# Patient Record
Sex: MALE | Race: WHITE | NOT HISPANIC OR LATINO | URBAN - METROPOLITAN AREA
[De-identification: names, ages, dates, MRNs, and addresses within clinical notes are randomized per-mention and may not be internally consistent; named-entity substitution may affect disease eponyms.]

---

## 2021-02-15 ENCOUNTER — OFFICE (AMBULATORY)
Dept: URBAN - METROPOLITAN AREA PATHOLOGY 4 | Facility: PATHOLOGY | Age: 51
End: 2021-02-15
Payer: COMMERCIAL

## 2021-02-15 ENCOUNTER — ON CAMPUS - OUTPATIENT (AMBULATORY)
Dept: URBAN - METROPOLITAN AREA HOSPITAL 2 | Facility: HOSPITAL | Age: 51
End: 2021-02-15
Payer: COMMERCIAL

## 2021-02-15 VITALS
RESPIRATION RATE: 18 BRPM | HEART RATE: 85 BPM | HEART RATE: 110 BPM | HEART RATE: 90 BPM | SYSTOLIC BLOOD PRESSURE: 100 MMHG | SYSTOLIC BLOOD PRESSURE: 109 MMHG | HEART RATE: 113 BPM | OXYGEN SATURATION: 100 % | RESPIRATION RATE: 15 BRPM | SYSTOLIC BLOOD PRESSURE: 105 MMHG | DIASTOLIC BLOOD PRESSURE: 84 MMHG | SYSTOLIC BLOOD PRESSURE: 137 MMHG | DIASTOLIC BLOOD PRESSURE: 101 MMHG | WEIGHT: 190 LBS | DIASTOLIC BLOOD PRESSURE: 73 MMHG | SYSTOLIC BLOOD PRESSURE: 121 MMHG | OXYGEN SATURATION: 96 % | SYSTOLIC BLOOD PRESSURE: 142 MMHG | SYSTOLIC BLOOD PRESSURE: 111 MMHG | OXYGEN SATURATION: 95 % | HEIGHT: 68 IN | DIASTOLIC BLOOD PRESSURE: 90 MMHG | HEART RATE: 70 BPM | RESPIRATION RATE: 16 BRPM | TEMPERATURE: 96.9 F | SYSTOLIC BLOOD PRESSURE: 107 MMHG | DIASTOLIC BLOOD PRESSURE: 91 MMHG | HEART RATE: 96 BPM | DIASTOLIC BLOOD PRESSURE: 69 MMHG | SYSTOLIC BLOOD PRESSURE: 104 MMHG | OXYGEN SATURATION: 97 % | DIASTOLIC BLOOD PRESSURE: 83 MMHG | HEART RATE: 78 BPM | HEART RATE: 102 BPM

## 2021-02-15 DIAGNOSIS — K64.1 SECOND DEGREE HEMORRHOIDS: ICD-10-CM

## 2021-02-15 DIAGNOSIS — Z83.71 FAMILY HISTORY OF COLONIC POLYPS: ICD-10-CM

## 2021-02-15 DIAGNOSIS — Z12.11 ENCOUNTER FOR SCREENING FOR MALIGNANT NEOPLASM OF COLON: ICD-10-CM

## 2021-02-15 DIAGNOSIS — K31.7 POLYP OF STOMACH AND DUODENUM: ICD-10-CM

## 2021-02-15 DIAGNOSIS — R13.10 DYSPHAGIA, UNSPECIFIED: ICD-10-CM

## 2021-02-15 DIAGNOSIS — K57.30 DIVERTICULOSIS OF LARGE INTESTINE WITHOUT PERFORATION OR ABS: ICD-10-CM

## 2021-02-15 LAB
GI HISTOLOGY: A. UNSPECIFIED: (no result)
GI HISTOLOGY: PDF REPORT: (no result)

## 2021-02-15 PROCEDURE — 43450 DILATE ESOPHAGUS 1/MULT PASS: CPT | Performed by: INTERNAL MEDICINE

## 2021-02-15 PROCEDURE — 88305 TISSUE EXAM BY PATHOLOGIST: CPT | Mod: 26 | Performed by: INTERNAL MEDICINE

## 2021-02-15 PROCEDURE — 45378 DIAGNOSTIC COLONOSCOPY: CPT | Mod: 33 | Performed by: INTERNAL MEDICINE

## 2021-02-15 PROCEDURE — 43235 EGD DIAGNOSTIC BRUSH WASH: CPT | Performed by: INTERNAL MEDICINE

## 2023-10-21 ENCOUNTER — APPOINTMENT (OUTPATIENT)
Dept: GENERAL RADIOLOGY | Facility: HOSPITAL | Age: 53
End: 2023-10-21
Payer: MEDICAID

## 2023-10-21 ENCOUNTER — HOSPITAL ENCOUNTER (OUTPATIENT)
Facility: HOSPITAL | Age: 53
Discharge: HOME | End: 2023-10-21
Attending: EMERGENCY MEDICINE | Admitting: EMERGENCY MEDICINE
Payer: MEDICAID

## 2023-10-21 VITALS
WEIGHT: 200 LBS | TEMPERATURE: 97.2 F | HEART RATE: 68 BPM | BODY MASS INDEX: 30.31 KG/M2 | RESPIRATION RATE: 18 BRPM | SYSTOLIC BLOOD PRESSURE: 159 MMHG | DIASTOLIC BLOOD PRESSURE: 106 MMHG | HEIGHT: 68 IN | OXYGEN SATURATION: 98 %

## 2023-10-21 DIAGNOSIS — S93.402A SPRAIN OF LEFT ANKLE, UNSPECIFIED LIGAMENT, INITIAL ENCOUNTER: Primary | ICD-10-CM

## 2023-10-21 DIAGNOSIS — S82.892A CLOSED AVULSION FRACTURE OF LEFT ANKLE, INITIAL ENCOUNTER: ICD-10-CM

## 2023-10-21 PROCEDURE — 90471 IMMUNIZATION ADMIN: CPT | Performed by: EMERGENCY MEDICINE

## 2023-10-21 PROCEDURE — 73600 X-RAY EXAM OF ANKLE: CPT

## 2023-10-21 PROCEDURE — G0463 HOSPITAL OUTPT CLINIC VISIT: HCPCS | Performed by: EMERGENCY MEDICINE

## 2023-10-21 PROCEDURE — 25010000002 TETANUS-DIPHTH-ACELL PERTUSSIS 5-2.5-18.5 LF-MCG/0.5 SUSPENSION PREFILLED SYRINGE: Performed by: EMERGENCY MEDICINE

## 2023-10-21 PROCEDURE — 90715 TDAP VACCINE 7 YRS/> IM: CPT | Performed by: EMERGENCY MEDICINE

## 2023-10-21 RX ORDER — IBUPROFEN 600 MG/1
600 TABLET ORAL ONCE
Status: COMPLETED | OUTPATIENT
Start: 2023-10-21 | End: 2023-10-21

## 2023-10-21 RX ADMIN — TETANUS TOXOID, REDUCED DIPHTHERIA TOXOID AND ACELLULAR PERTUSSIS VACCINE, ADSORBED 0.5 ML: 5; 2.5; 8; 8; 2.5 SUSPENSION INTRAMUSCULAR at 20:22

## 2023-10-21 RX ADMIN — IBUPROFEN 600 MG: 600 TABLET, FILM COATED ORAL at 20:22

## 2023-10-21 NOTE — FSED PROVIDER NOTE
Subjective   History of Present Illness   53 yom complains of left ankle pain after missing a step and rolling his ankle. The patient complains of pain and swelling. No neck pain, back pain or head injury.       Review of Systems   Constitutional: Negative.    Musculoskeletal:  Negative for back pain.        Ankle pain and swelling   Skin: Negative.    Neurological:  Negative for headaches.   All other systems reviewed and are negative.      No past medical history on file.    No Known Allergies    No past surgical history on file.    No family history on file.    Social History     Socioeconomic History    Marital status: Single           Objective   Physical Exam  Vitals reviewed.   Constitutional:       Appearance: Normal appearance.   HENT:      Head: Normocephalic and atraumatic.      Nose: Nose normal.      Mouth/Throat:      Mouth: Mucous membranes are moist.      Pharynx: Oropharynx is clear.   Eyes:      Extraocular Movements: Extraocular movements intact.      Pupils: Pupils are equal, round, and reactive to light.   Cardiovascular:      Rate and Rhythm: Normal rate and regular rhythm.   Pulmonary:      Effort: Pulmonary effort is normal.      Breath sounds: Normal breath sounds.   Musculoskeletal:         General: Normal range of motion.      Cervical back: Normal range of motion and neck supple.      Left ankle: Swelling present. Decreased range of motion. Anterior drawer test negative. Normal pulse.        Legs:       Comments: Warm and well perfused  2+ pulses  Sensation intact to distal foot as tested  Stable joint  ROM limited by pain and swelling   Skin:     General: Skin is warm.      Capillary Refill: Capillary refill takes less than 2 seconds.   Neurological:      Mental Status: He is alert and oriented to person, place, and time.      Sensory: No sensory deficit.      Motor: No weakness.         Procedures           ED Course  ED Course as of 10/22/23 0112   Sat Oct 21, 2023   2001 Discussed  test results and treatment plan.  [BM]      ED Course User Index  [BM] Jody Rincon MD                                           Medical Decision Making  Problems Addressed:  Closed avulsion fracture of left ankle, initial encounter: complicated acute illness or injury  Sprain of left ankle, unspecified ligament, initial encounter: complicated acute illness or injury    Amount and/or Complexity of Data Reviewed  Radiology: ordered.    Risk  Prescription drug management.        Final diagnoses:   Sprain of left ankle, unspecified ligament, initial encounter   Closed avulsion fracture of left ankle, initial encounter       ED Disposition  ED Disposition       ED Disposition   Discharge    Condition   Stable    Comment   --               Wisam Marte, APRN  [address] [facility] [place] [address] [phone]    Schedule an appointment as soon as possible for a visit   re-evaluation    Tejas Guzman MD  [address] [place] [address] [place] [address] [phone]    Schedule an appointment as soon as possible for a visit in 2 days  re-evaluation         Medication List      No changes were made to your prescriptions during this visit.

## 2023-10-22 NOTE — DISCHARGE INSTRUCTIONS
Rest, ice and elevate your injury. No weight bearing for 1 week. Wear boot until follow-up with Dr Guzman. Rotate Tylenol and Motrin for pain. Return if problems.

## 2023-10-24 ENCOUNTER — OFFICE VISIT (OUTPATIENT)
Dept: PODIATRY | Facility: CLINIC | Age: 53
End: 2023-10-24
Payer: MEDICAID

## 2023-10-24 VITALS — RESPIRATION RATE: 20 BRPM | BODY MASS INDEX: 30.31 KG/M2 | WEIGHT: 200 LBS | HEIGHT: 68 IN

## 2023-10-24 DIAGNOSIS — S92.152A CLOSED AVULSION FRACTURE OF TALUS, LEFT, INITIAL ENCOUNTER: ICD-10-CM

## 2023-10-24 DIAGNOSIS — S93.402A MODERATE ANKLE SPRAIN, LEFT, INITIAL ENCOUNTER: ICD-10-CM

## 2023-10-24 DIAGNOSIS — M25.572 ACUTE LEFT ANKLE PAIN: Primary | ICD-10-CM

## 2023-10-24 RX ORDER — LEVOTHYROXINE SODIUM 0.12 MG/1
TABLET ORAL EVERY 24 HOURS
COMMUNITY
Start: 2023-08-07

## 2023-10-24 RX ORDER — PREDNISONE 20 MG/1
TABLET ORAL
COMMUNITY
Start: 2023-07-04 | End: 2023-10-25

## 2023-10-24 RX ORDER — ATORVASTATIN CALCIUM 20 MG/1
TABLET, FILM COATED ORAL EVERY 24 HOURS
COMMUNITY

## 2023-10-24 RX ORDER — ESCITALOPRAM OXALATE 10 MG/1
TABLET ORAL EVERY 24 HOURS
COMMUNITY

## 2023-10-24 NOTE — PROGRESS NOTES
"10/24/2023  Foot and Ankle Surgery - Established Patient/Follow-up  Provider: Dr. Jairon Laboy DPM  Location: [facility]    Subjective:  Patrick Verdin is a 53 y.o. male.     Chief Complaint   Patient presents with    Left Ankle - Fracture     Avulsion fx    Initial Evaluation     JARED Marte dian  10/23/2023       HPI: Patrick Verdin is a 53-year-old male who presents to the office today for evaluation of a left ankle injury. He is accompanied by an adult female today.    The patient reports he injured his left ankle on 10/21/2023 while playing golf. He presented to [facility] in [place], [place], where x-rays were obtained. He states he was given a boot and crutches. He was informed he may have an avulsion fracture. He is able to move his left ankle and stand. He is unable to apply weight on his left ankle. He has been taking ibuprofen for pain relief.    The patient is self-employed as a small business owner.      No Known Allergies    Current Outpatient Medications on File Prior to Visit   Medication Sig Dispense Refill    atorvastatin (LIPITOR) 20 MG tablet Daily.      escitalopram (LEXAPRO) 10 MG tablet Daily.      levothyroxine (SYNTHROID, LEVOTHROID) 125 MCG tablet Daily.      [DISCONTINUED] predniSONE (DELTASONE) 20 MG tablet TAKE 1 TABLET BY MOUTH 3 TIMES A DAY X5 DAYS,2 TABS 3 TIMES A DAY X2 DAYS,1 TAB DAILY X1 DAY       No current facility-administered medications on file prior to visit.       Objective   Resp 20   Ht 172.7 cm (68\")   Wt 90.7 kg (200 lb)   BMI 30.41 kg/m²     Foot/Ankle Exam    GENERAL  Orientation:  AAOx3  Affect:  appropriate    VASCULAR     Left Foot Vascularity   Normal vascular exam    Dorsalis pedis:  2+  Posterior tibial:  2+  Skin temperature:  warm  Edema grading:  None  CFT:  < 3 seconds  Pedal hair growth:  Present  Varicosities:  none     NEUROLOGIC     Left Foot Neurologic   Light touch sensation: normal  Hot/Cold sensation:  normal  Achilles " reflex:  2+    MUSCULOSKELETAL     Left Foot Musculoskeletal   Arch:  Normal    MUSCLE STRENGTH     Left Foot Muscle Strength   Normal strength    Foot dorsiflexion:  5  Foot plantar flexion:  5  Foot inversion:  5  Foot eversion:  5    DERMATOLOGIC      Right Foot Dermatologic   Nails comment:  Nails 1-5     Left Foot Dermatologic   Skin  Left foot skin is intact.   Nails comment:  Nails 1-5    TESTS     Left Foot Tests   Anterior drawer: negative  Varus tilt: negative     Left foot additional comments: Moderate discomfort with palpation involving the anterolateral aspect of the ankle and dorsal aspect of the midfoot. Moderate ecchymosis. Moderate swelling. No proximal fibular pain. No gross deformity. Range of motion, muscle strength, and instability testing deferred secondary to guarding.      Assessment & Plan   Diagnoses and all orders for this visit:    1. Acute left ankle pain (Primary)    2. Moderate ankle sprain, left, initial encounter    3. Closed avulsion fracture of talus, left, initial encounter    Other orders  -     methylPREDNISolone (MEDROL) 4 MG dose pack; Take as directed on package instructions.  Dispense: 21 tablet; Refill: 0      Patient presents to the office today for evaluation of left ankle injury. Discussed the etiology and biomechanics involved with his left ankle injury. Recommend continuing to wear the boot for the next 2 weeks. He may discontinue use of the crutches as tolerated. Advised the patient to work on range of motion exercises. Discussed rest, ice, compression, elevation, and anti-inflammatories. Will prescribe a Medrol Dosepak. Advised the patient he ma y perform low impact exercises at tolerated. He will return to the office in 2 weeks for re-evaluation and repeat x-rays.     Greater than 45 minutes was spent before, during, and after evaluation for patient care.      No orders of the defined types were placed in this encounter.         Note is dictated utilizing voice  recognition software. Unfortunately this leads to occasional typographical errors. I apologize in advance if the situation occurs. If questions occur please do not hesitate to call our office.    Transcribed from ambient dictation for KAREN Laboy DPM by Sundar Clements.  10/24/23   10:46 EDT    Patient or patient representative verbalized consent to the visit recording.  I have personally performed the services described in this document as transcribed by the above individual, and it is both accurate and complete.

## 2023-10-25 RX ORDER — METHYLPREDNISOLONE 4 MG/1
TABLET ORAL
Qty: 21 TABLET | Refills: 0 | Status: SHIPPED | OUTPATIENT
Start: 2023-10-25

## 2023-10-26 ENCOUNTER — PATIENT ROUNDING (BHMG ONLY) (OUTPATIENT)
Dept: ORTHOPEDIC SURGERY | Facility: CLINIC | Age: 53
End: 2023-10-26
Payer: MEDICAID

## 2023-10-26 NOTE — PROGRESS NOTES
Patient comes to clinic for follow up anticoagulation visit.   Last INR 2/27 was 2.5.  Dose maintained per protocol.   Today's INR is 3.2 and is above goal range.    Current warfarin dosing verified with patient.  Patient was informed that their INR result was above therapeutic range and instructed to decrease current dose with a one time decrease of 2.5mg on 3/28. Pt reluctant to change maintenance dose.   Discussed return date for next INR in 4 weeks with plan of decrease if remains >3.0.  Denies any changes in diet or medications. States increase in activity with change of season. Denies any unusual bleeding or bruising.  Denies any missed doses. See AAC flowsheet. Pt ambulated to office without assistive device. [facility]    Dr. Ascencio is in the office today supervising the treatment.        Patient was instructed to contact the clinic with any unusual bleeding or bruising, any changes in medications, diet, health status, lifestyle, or any other changes, questions or concerns. Patient verbalized understanding of all discussed.    MyChart encounter has been sent for patient rounding.

## 2023-11-01 ENCOUNTER — TELEPHONE (OUTPATIENT)
Dept: PODIATRY | Facility: CLINIC | Age: 53
End: 2023-11-01
Payer: MEDICAID

## 2023-11-01 NOTE — TELEPHONE ENCOUNTER
Provider: ALEXIS    Caller: ADAM     Relationship to Patient: [facility], [facility]SC[facility]RES    Phone Number: [phone]    Reason for Call: [facility] IS NEEDING THE REFERRAL NOTES FOR THIS PATIENT FOR WHEN HE WAS REFERRED TO SEE DR ESPINOZA FOR HIS LEFT FOOT. HE WAS REFERRED ON 10/23/23.   PLEASE FAX THOSE [phone]    When was the patient last seen: 10/24/23

## 2023-11-08 ENCOUNTER — OFFICE VISIT (OUTPATIENT)
Dept: PODIATRY | Facility: CLINIC | Age: 53
End: 2023-11-08
Payer: MEDICAID

## 2023-11-08 VITALS — HEIGHT: 68 IN | RESPIRATION RATE: 20 BRPM | BODY MASS INDEX: 30.31 KG/M2 | WEIGHT: 200 LBS

## 2023-11-08 DIAGNOSIS — S92.152D CLOSED AVULSION FRACTURE OF TALUS, LEFT, WITH ROUTINE HEALING, SUBSEQUENT ENCOUNTER: ICD-10-CM

## 2023-11-08 DIAGNOSIS — S93.402D MODERATE ANKLE SPRAIN, LEFT, SUBSEQUENT ENCOUNTER: ICD-10-CM

## 2023-11-08 DIAGNOSIS — M25.572 ACUTE LEFT ANKLE PAIN: Primary | ICD-10-CM

## 2023-11-08 NOTE — PROGRESS NOTES
"11/08/2023  Foot and Ankle Surgery - Established Patient/Follow-up  Provider: Dr. Jairon Laboy DPM  Location: [facility]    Subjective:  Patrick Verdin is a 53 y.o. male.     Chief Complaint   Patient presents with    Left Ankle - Follow-up       Avulsion fx        Follow-up     JUAN LUIS Estuardo ALICIA 07/2023       HPI: The patient is a 53-year-old male who presents to the clinic for a follow-up regarding his left ankle.    He reports he is feeling better. He states he is 2.5 weeks out from his injury. He notes he has been performing his range of motion exercises. The patient states he is 70 percent back to normal. He notes he only wears the boot when he is out of the house. He reports he has been swimming.    No Known Allergies    Current Outpatient Medications on File Prior to Visit   Medication Sig Dispense Refill    atorvastatin (LIPITOR) 20 MG tablet Daily.      escitalopram (LEXAPRO) 10 MG tablet Daily.      levothyroxine (SYNTHROID, LEVOTHROID) 125 MCG tablet Daily.      methylPREDNISolone (MEDROL) 4 MG dose pack Take as directed on package instructions. (Patient not taking: Reported on 11/8/2023) 21 tablet 0     No current facility-administered medications on file prior to visit.       Objective   Resp 20   Ht 172.7 cm (68\")   Wt 90.7 kg (200 lb)   BMI 30.41 kg/m²     Foot/Ankle Exam  GENERAL  Orientation:  AAOx3  Affect:  appropriate    VASCULAR     Left Foot Vascularity   Normal vascular exam    Dorsalis pedis:  2+  Posterior tibial:  2+  Skin temperature:  warm  Edema grading:  None  CFT:  < 3 seconds  Pedal hair growth:  Present  Varicosities:  none     NEUROLOGIC     Left Foot Neurologic   Light touch sensation: normal  Hot/Cold sensation:  normal  Achilles reflex:  2+    MUSCULOSKELETAL     Left Foot Musculoskeletal   Arch:  Normal    MUSCLE STRENGTH     Left Foot Muscle Strength   Normal strength    Foot dorsiflexion:  5  Foot plantar flexion:  5  Foot inversion:  5  Foot eversion:  " 5    DERMATOLOGIC      Right Foot Dermatologic   Nails comment:  Nails 1-5     Left Foot Dermatologic   Skin  Left foot skin is intact.   Nails comment:  Nails 1-5    TESTS     Left Foot Tests   Anterior drawer: negative  Varus tilt: negative     Left foot additional comments: Moderate discomfort with palpation involving the anterolateral aspect of the ankle and dorsal aspect of the midfoot. Moderate ecchymosis. Moderate swelling. No proximal fibular pain. No gross deformity. Range of motion, muscle strength, and instability testing deferred secondary to guarding.    11/08/2023: less swelling and discomfort involving the ankle. Range of motion has improved. No proximal fibular pain. No joanne instability noted on exam.    Assessment & Plan   Diagnoses and all orders for this visit:    1. Acute left ankle pain (Primary)    2. Closed avulsion fracture of talus, left, with routine healing, subsequent encounter  -     XR Ankle 3+ View Left    3. Moderate ankle sprain, left, subsequent encounter    Patient returns to the office today for a follow-up regarding his left ankle. Imaging was independently reviewed showing no shifted or changed or progressed. I recommend a lace-up ankle brace to help transition into a regular shoe. I advised the patient to increase his activities as tolerated. He will continue with his range of motion exercises. The patient will return to the office in 3 weeks for reevaluation. Greater than 20 minutes was spent before, during, and after evaluation for patient care.    Orders Placed This Encounter   Procedures    XR Ankle 3+ View Left     Order Specific Question:   Reason for Exam:     Answer:   F/U  Avulsion fx rm 13 wb     Order Specific Question:   Does this patient have a diabetic monitoring/medication delivering device on?     Answer:   No     Order Specific Question:   Release to patient     Answer:   Routine Release [[id number]]          Note is dictated utilizing voice recognition  software. Unfortunately this leads to occasional typographical errors. I apologize in advance if the situation occurs. If questions occur please do not hesitate to call our office.    Transcribed from ambient dictation for KAREN Laboy DPM by Linda Jones.  11/08/23   10:03 EST    Patient or patient representative verbalized consent to the visit recording.  I have personally performed the services described in this document as transcribed by the above individual, and it is both accurate and complete.